# Patient Record
Sex: FEMALE | Race: WHITE | Employment: UNEMPLOYED | URBAN - NONMETROPOLITAN AREA
[De-identification: names, ages, dates, MRNs, and addresses within clinical notes are randomized per-mention and may not be internally consistent; named-entity substitution may affect disease eponyms.]

---

## 2023-04-27 ENCOUNTER — HOSPITAL ENCOUNTER (OUTPATIENT)
Age: 38
Discharge: HOME | End: 2023-04-27
Payer: COMMERCIAL

## 2023-04-27 LAB
ANION GAP SERPL CALCULATED.3IONS-SCNC: 9 MMOL/L (ref 9–17)
BUN SERPL-MCNC: 9 MG/DL (ref 6–20)
BUN/CREAT BLD: 15 (ref 9–20)
CALCIUM SERPL-MCNC: 9.6 MG/DL (ref 8.6–10.4)
CHLORIDE SERPL-SCNC: 102 MMOL/L (ref 98–107)
CO2 SERPL-SCNC: 26 MMOL/L (ref 20–31)
CREAT SERPL-MCNC: 0.59 MG/DL (ref 0.5–0.9)
GFR SERPL CREATININE-BSD FRML MDRD: >60 ML/MIN/1.73M2
GLUCOSE SERPL-MCNC: 99 MG/DL (ref 70–99)
HCT VFR BLD AUTO: 39.7 % (ref 36.3–47.1)
HGB BLD-MCNC: 13.6 G/DL (ref 11.9–15.1)
MCH RBC QN AUTO: 34.3 PG (ref 25.2–33.5)
MCHC RBC AUTO-ENTMCNC: 34.3 G/DL (ref 28.4–34.8)
MCV RBC AUTO: 100.3 FL (ref 82.6–102.9)
NRBC AUTOMATED: 0 PER 100 WBC
PDW BLD-RTO: 11.9 % (ref 11.8–14.4)
PLATELET # BLD AUTO: 242 K/UL (ref 138–453)
PMV BLD AUTO: 10.5 FL (ref 8.1–13.5)
POTASSIUM SERPL-SCNC: 4.2 MMOL/L (ref 3.7–5.3)
RBC # BLD: 3.96 M/UL (ref 3.95–5.11)
SODIUM SERPL-SCNC: 137 MMOL/L (ref 135–144)
WBC # BLD AUTO: 4 K/UL (ref 3.5–11.3)

## 2023-04-27 PROCEDURE — 85027 COMPLETE CBC AUTOMATED: CPT

## 2023-04-27 PROCEDURE — 80048 BASIC METABOLIC PNL TOTAL CA: CPT

## 2023-04-27 PROCEDURE — 36415 COLL VENOUS BLD VENIPUNCTURE: CPT

## 2023-05-10 NOTE — PROGRESS NOTES
Patient contacted PAT d/t symptoms of respiratory infection. Patient states her symptoms started Wednesday 5/2 and has experienced productive cough, chest congestion, sore throat and nasal drainage. Haydee Cristobal notified of symptoms and advised for patient to contact Dr Uche Rosado office once symptoms have resolved then postpone 4 weeks from date of symptoms being resolved. Marimar with Dr Uche Rosado office notified of decision. Navi Moeller states that patient was considering a local procedure and that the office will contact patient later this date to discuss the local option.

## 2023-05-10 NOTE — PROGRESS NOTES
Spoke with PAT and reviewed patient's chart. Patient currently c/o URI with symptom onset last week. Productive cough, fatigue, sinus congestion. As of today, patient's symptoms are improving but still has cough and congestion. PAT to notify patient to call Dr. Mayra Montenegro office once symptoms have resolved so that case can be rescheduled for 4 weeks out from resolution of URI.

## 2023-05-10 NOTE — PROGRESS NOTES
Marimar with Dr Amy Amador office called PAT and states that patient will proceed with local surgery.

## 2023-05-12 ENCOUNTER — HOSPITAL ENCOUNTER (OUTPATIENT)
Age: 38
Setting detail: OUTPATIENT SURGERY
Discharge: HOME | End: 2023-05-12
Attending: PODIATRIST | Admitting: PODIATRIST
Payer: COMMERCIAL

## 2023-05-12 VITALS
DIASTOLIC BLOOD PRESSURE: 88 MMHG | HEART RATE: 92 BPM | OXYGEN SATURATION: 100 % | HEIGHT: 69 IN | SYSTOLIC BLOOD PRESSURE: 124 MMHG | WEIGHT: 138 LBS | TEMPERATURE: 97.4 F | RESPIRATION RATE: 16 BRPM | BODY MASS INDEX: 20.44 KG/M2

## 2023-05-12 DIAGNOSIS — B07.0 VERRUCA PLANTARIS: Primary | ICD-10-CM

## 2023-05-12 PROCEDURE — 2709999900 HC NON-CHARGEABLE SUPPLY: Performed by: PODIATRIST

## 2023-05-12 PROCEDURE — 2500000003 HC RX 250 WO HCPCS: Performed by: PODIATRIST

## 2023-05-12 PROCEDURE — 6370000000 HC RX 637 (ALT 250 FOR IP): Performed by: PODIATRIST

## 2023-05-12 PROCEDURE — 3600000002 HC SURGERY LEVEL 2 BASE: Performed by: PODIATRIST

## 2023-05-12 PROCEDURE — 2720000010 HC SURG SUPPLY STERILE: Performed by: PODIATRIST

## 2023-05-12 PROCEDURE — 3600000012 HC SURGERY LEVEL 2 ADDTL 15MIN: Performed by: PODIATRIST

## 2023-05-12 RX ORDER — LIDOCAINE HYDROCHLORIDE 10 MG/ML
INJECTION, SOLUTION INFILTRATION; PERINEURAL PRN
Status: DISCONTINUED | OUTPATIENT
Start: 2023-05-12 | End: 2023-05-12 | Stop reason: ALTCHOICE

## 2023-05-12 RX ORDER — NEOMYCIN SULFATE, POLYMYXIN B SULFATE AND BACITRACIN ZINC 3.5; 10000; 4 MG/G; [USP'U]/G; [USP'U]/G
OINTMENT OPHTHALMIC PRN
Status: DISCONTINUED | OUTPATIENT
Start: 2023-05-12 | End: 2023-05-12 | Stop reason: ALTCHOICE

## 2023-05-12 RX ORDER — HYDROCODONE BITARTRATE AND ACETAMINOPHEN 5; 325 MG/1; MG/1
1 TABLET ORAL EVERY 4 HOURS PRN
Qty: 30 TABLET | Refills: 0 | Status: SHIPPED | OUTPATIENT
Start: 2023-05-12 | End: 2023-05-17

## 2023-05-12 RX ORDER — BUPIVACAINE HYDROCHLORIDE 5 MG/ML
INJECTION, SOLUTION EPIDURAL; INTRACAUDAL PRN
Status: DISCONTINUED | OUTPATIENT
Start: 2023-05-12 | End: 2023-05-12 | Stop reason: ALTCHOICE

## 2023-05-12 NOTE — OP NOTE
Operative Note      Patient: Farooq Howe  YOB: 1985  MRN: [id number]    Date of Procedure: 5/12/2023    Pre-Op Diagnosis Codes: * Viral warts, unspecified type [B07.9]    Post-Op Diagnosis: Same       Procedure(s):  FOOT LESION BIOPSY EXCISION - HOLMIUM LASER EXCISION OF VERRUCA    Surgeon(s):  Elsa Roy DPM    Assistant:   * No surgical staff found *    Anesthesia: Local    Estimated Blood Loss (mL): Minimal    Complications: None    Specimens:   * No specimens in log *    Implants:  * No implants in log *      Drains: * No LDAs found *    Findings: See the narrative        Detailed Description of Procedure: The patient has signs and symptoms, both clinically and radiographically, that are consistent with the preprocedure diagnosis. It was determined the patient would benefit from surgical intervention. All potential risks, benefits, and complications of surgery were discussed with the patient prior the procedure. The patient wished to proceed with surgery. Informed written consent was obtained. The patient was brought from the preoperative area placed in operating table in supine position. Following i injection of local anesthetic, with 7 cc of 1% lidocaine plain, the operative lower extremity was scrubbed, prepped, and draped in usual sterile fashion. The following procedure was then performed:  Excision of verruca, 10 lesions in total, left foot: Attention was directed to the plantar aspect of the patient's heel where a confluence of plantar verruca was noted. Utilizing a #15 blade, hyperkeratotic tissue was debrided down to the level of the lesions. At this time, the holmium laser with settings of 1 J and 10 W was used to vaporize the verruca down to the level of the basement membrane. The tissue was then debrided with a 15 blade and the base of the wound cauterized with the laser. Antibiotic ointment and Adaptic were placed. 10 mL of 0.5% Marcaine was infiltrated.   The wound

## 2023-05-12 NOTE — BRIEF OP NOTE
Brief Postoperative Note      Patient: Effie Suárez  YOB: 1985  MRN: [id number]    Date of Procedure: 5/12/2023    Pre-Op Diagnosis Codes:      * Viral warts, unspecified type [B07.9]    Post-Op Diagnosis: Same       Procedure(s):  FOOT LESION BIOPSY EXCISION - HOLMIUM LASER EXCISION OF VERRUCA    Surgeon(s):  Josselin Lebron DPM    Assistant:  * No surgical staff found *    Anesthesia: Local    Estimated Blood Loss (mL): Minimal    Complications: None    Specimens:   * No specimens in log *    Implants:  * No implants in log *      Drains: * No LDAs found *    Findings: See the narrative        Electronically signed by Josselin Lebron DPM on 5/12/2023 at 2:38 PM

## 2023-05-12 NOTE — PROGRESS NOTES
Discharge instructions reviewed with patient, who has had no sedation and she implies understanding. Vitals: B/P 145/82, P90, SpO2 99%, R16. Denies pain complaints. Dressing to left foot clean, dry & intact.

## 2023-05-12 NOTE — DISCHARGE INSTRUCTIONS
SAME DAY SURGERY DISCHARGE INSTRUCTIONS    1. May have a regular diet. 2.  If your bandages become soaked with bright red blood, place another dressing pad over your bandages. (DO NOT remove original bandage.)  Call your surgeon for further instructions. A small amount of bright red blood is to be expected. 3.  Report the following signs or any questions regarding your physical condition to your surgeon immediately:    Excessive swelling of, or around the wound area. Redness. Temperature of 100 degrees (F) or above. Excessive pain. 4.  Call your surgeon for any questions regarding your surgery. 5.  Wash hands before and after incision care. It is important to practice good personal hygiene during the post op period. Additional instructions:    1. Weightbearing as tolerated  2. Keep the dressing intact for 48 hours, then begin changing daily with antibiotic ointment and a dressing.

## (undated) DEVICE — BANDAGE,GAUZE,BULKEE II,4.5"X4.1YD,NS,LF: Brand: MEDLINE

## (undated) DEVICE — HYPODERMIC SAFETY NEEDLE: Brand: MAGELLAN

## (undated) DEVICE — YANKAUER,BULB TIP,W/O VENT,RIGID,STERILE: Brand: MEDLINE

## (undated) DEVICE — NEEDLE HYPO 25GA L1.5IN BLU POLYPR HUB S STL REG BVL STR

## (undated) DEVICE — HAND AND FT PK

## (undated) DEVICE — STOCKINETTE ORTH W9XL36IN COT 2 PLY HLLW FOR HANDLING LMB

## (undated) DEVICE — ASTOUND STANDARD SURGICAL GOWN, XL: Brand: CONVERTORS

## (undated) DEVICE — Device

## (undated) DEVICE — GAUZE,SPONGE,FLUFF,4"X4",12PLY,STRL,2'S: Brand: MEDLINE

## (undated) DEVICE — GLOVE ORANGE PI 7   MSG9070

## (undated) DEVICE — BANDAGE COBAN 4 IN COMPR W4INXL5YD FOAM COHESIVE QUIK STK SELF ADH SFT

## (undated) DEVICE — DISCONTINUED NO SUB IDED TG GLOVE SURG SENSICARE ALOE LT LF PF ST GRN SZ 8

## (undated) DEVICE — BLADE, TONGUE, 6", STERILE: Brand: MEDLINE

## (undated) DEVICE — BANDAGE COMPR W4INXL9FT EXSANG SGL LAYERED NO CLSR ESMARCH

## (undated) DEVICE — STRIP,CLOSURE,WOUND,MEDI-STRIP,1/2X4: Brand: MEDLINE

## (undated) DEVICE — BANDAGE,GAUZE,BULKEE II,4.5"X4.1YD,STRL: Brand: MEDLINE

## (undated) DEVICE — UNDERGLOVE SURG SZ 8 BLU LTX FREE SYN POLYISOPRENE POLYMER

## (undated) DEVICE — TUBING, SUCTION, 9/32" X 12', STRAIGHT: Brand: MEDLINE INDUSTRIES, INC.